# Patient Record
Sex: FEMALE | Race: WHITE | NOT HISPANIC OR LATINO | Employment: UNEMPLOYED | URBAN - METROPOLITAN AREA
[De-identification: names, ages, dates, MRNs, and addresses within clinical notes are randomized per-mention and may not be internally consistent; named-entity substitution may affect disease eponyms.]

---
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
## 2025-02-19 DIAGNOSIS — N94.6 DYSMENORRHEA: ICD-10-CM

## 2025-02-19 NOTE — TELEPHONE ENCOUNTER
Reason for call:   [x] Refill   [] Prior Auth  [] Other:     Office:   [] PCP/Provider -   [x] Specialty/Provider - Breanna Neumann,     Medication: norethindrone-ethinyl estradiol (JUNEL FE 1/20) 1-20 MG-MCG     Dose/Frequency: Take 1 tablet by mouth daily,     Quantity: 28    Pharmacy: [facility]    Does the patient have enough for 3 days?   [] Yes   [x] No - Send as HP to POD - pt is out of the medication

## 2025-02-24 NOTE — TELEPHONE ENCOUNTER
Pt requesting for courstey until follow up appt scheduled 3/19/25    Reason for call:    Refill        Office:    PCP/Provider -    Specialty/Provider - tor Oro      Medication: norethindrone-ethinyl estradiol (JUNEL FE 1/20) 1-20 MG-MCG      Dose/Frequency: Take 1 tablet by mouth daily,      Quantity: 28     Pharmacy: [facility]     Does the patient have enough for 3 days?    Yes    No - Send as HP to POD - pt is out of the medication

## 2025-02-25 RX ORDER — NORETHINDRONE ACETATE AND ETHINYL ESTRADIOL 1MG-20(21)
1 KIT ORAL DAILY
Qty: 90 TABLET | Refills: 0 | Status: SHIPPED | OUTPATIENT
Start: 2025-02-25

## 2025-03-19 ENCOUNTER — ANNUAL EXAM (OUTPATIENT)
Dept: OBGYN CLINIC | Facility: CLINIC | Age: 20
End: 2025-03-19
Payer: COMMERCIAL

## 2025-03-19 VITALS
BODY MASS INDEX: 22.5 KG/M2 | SYSTOLIC BLOOD PRESSURE: 112 MMHG | HEIGHT: 64 IN | WEIGHT: 131.8 LBS | DIASTOLIC BLOOD PRESSURE: 60 MMHG

## 2025-03-19 DIAGNOSIS — Z01.419 GYNECOLOGIC EXAM NORMAL: Primary | ICD-10-CM

## 2025-03-19 DIAGNOSIS — N94.6 DYSMENORRHEA: ICD-10-CM

## 2025-03-19 PROCEDURE — 99395 PREV VISIT EST AGE 18-39: CPT | Performed by: PHYSICIAN ASSISTANT

## 2025-03-19 RX ORDER — NORETHINDRONE ACETATE AND ETHINYL ESTRADIOL 1MG-20(21)
1 KIT ORAL DAILY
Qty: 84 TABLET | Refills: 3 | Status: SHIPPED | OUTPATIENT
Start: 2025-03-19

## 2025-03-19 NOTE — PROGRESS NOTES
[facility] - [place] [id number] [place] [address]    ASSESSMENT/PLAN: Kanika Bridges is a 19 y.o.  who presents for annual gynecologic exam.    Encounter for routine gynecologic examination  - Routine well woman exam completed today.  - HPV Vaccination status: Immunization series complete  - STI screening offered including HIV testing: offered, pt declined  - Contraceptive counseling discussed.  Current contraception: oral contraceptives (estrogen/progesterone), Junel Fe 1/20:       Additional problems addressed during this visit:  1. Gynecologic exam normal  Assessment & Plan:  Pap guidelines reviewed. Will plan to start pap smears at age 21.   Continue Junel Fe 1/20 script renewed to pharmacy. Breakthough pain likely secondary to being off OCP. If continues on OCP recommend considering pelvic ultrasound to further evaluate.   Return to office for annual or as needed.   2. Dysmenorrhea  -     norethindrone-ethinyl estradiol (JUNEL FE 1/20) 1-20 MG-MCG per tablet; Take 1 tablet by mouth daily      CC:  Annual Gynecologic Examination    HPI: Kanika Bridges is a 19 y.o.  who presents for annual gynecologic examination. Currently on Junel Fe 1/20, had to go off for 3-4 weeks because she didn't have refill, reports bleeding was heavy during that time. Has restarted.   Has been sexually active in the past. Has new boyfriend but has not been sexually active yet, declines STD testing.     ROS: Negative except as noted in HPI    Patient's last menstrual period was 2025 (exact date).       She  reports that she is not currently sexually active and has had partner(s) who are male. She reports using the following methods of birth control/protection: OCP and Pill.       The following portions of the patient's history were reviewed and updated as appropriate:   Past Medical History:   Diagnosis Date    Asthma     exercise induced    Curvature of spine     Vocal cord dysfunction      Past Surgical  "History:   Procedure Laterality Date    WRIST SURGERY Left     ulna and radius. 2019 and 2020     Family History   Problem Relation Age of Onset    Hashimoto's thyroiditis Mother     Coronary artery disease Maternal Grandmother     Thyroid disease Maternal Grandmother     Coronary artery disease Maternal Grandfather     Asthma Paternal Grandmother     Breast cancer Neg Hx     Uterine cancer Neg Hx     Ovarian cancer Neg Hx     Colon cancer Neg Hx      Social History     Socioeconomic History    Marital status: Single     Spouse name: None    Number of children: None    Years of education: None    Highest education level: None   Occupational History    None   Tobacco Use    Smoking status: Never     Passive exposure: Never    Smokeless tobacco: Never   Vaping Use    Vaping status: Never Used   Substance and Sexual Activity    Alcohol use: Never    Drug use: Never    Sexual activity: Not Currently     Partners: Male     Birth control/protection: OCP, Pill   Other Topics Concern    None   Social History Narrative    None     Social Drivers of Health     Financial Resource Strain: Not on file   Food Insecurity: Not on file   Transportation Needs: Not on file   Physical Activity: Not on file   Stress: Not on file   Social Connections: Not on file   Intimate Partner Violence: Not on file   Housing Stability: Not on file     Outpatient Medications Marked as Taking for the 3/19/25 encounter (Annual Exam) with Breanna Neumann PA-C   Medication    albuterol (PROVENTIL HFA,VENTOLIN HFA) 90 mcg/act inhaler    norethindrone-ethinyl estradiol (JUNEL FE 1/20) 1-20 MG-MCG per tablet    [DISCONTINUED] norethindrone-ethinyl estradiol (JUNEL FE 1/20) 1-20 MG-MCG per tablet     Allergies   Allergen Reactions    Iodinated Contrast Media Other (See Comments)     Red and yellow - she does not stay focus           Objective:  /60 (BP Location: Left arm, Patient Position: Sitting, Cuff Size: Standard)   Ht 5' 4\" (1.626 m)   Wt " 59.8 kg (131 lb 12.8 oz)   LMP 03/02/2025 (Exact Date)   Breastfeeding No   BMI 22.62 kg/m²        Chaperone present? Offered, declines.     Physical Exam  Constitutional:       Appearance: Normal appearance. She is well-developed.   Genitourinary:      Vulva and bladder normal.      No lesions in the vagina.      Right Labia: No rash, tenderness, lesions or skin changes.     Left Labia: No tenderness, lesions, skin changes or rash.     No labial fusion noted.      No inguinal adenopathy present in the right or left side.     No vaginal discharge, erythema, tenderness or bleeding.        Right Adnexa: not tender, not full and no mass present.     Left Adnexa: not tender, not full and no mass present.     No cervical motion tenderness, discharge or lesion.      Uterus is not enlarged, tender or irregular.      No uterine mass detected.     No urethral prolapse, tenderness or mass present.      Bladder is not tender.    Breasts:     Breasts are symmetrical.      Right: No swelling, bleeding, inverted nipple, mass, nipple discharge, skin change or tenderness.      Left: No swelling, bleeding, inverted nipple, mass, nipple discharge, skin change or tenderness.   HENT:      Head: Normocephalic and atraumatic.   Neck:      Thyroid: No thyromegaly.   Cardiovascular:      Rate and Rhythm: Normal rate and regular rhythm.      Heart sounds: Normal heart sounds. No murmur heard.     No friction rub. No gallop.   Pulmonary:      Effort: Pulmonary effort is normal. No respiratory distress.      Breath sounds: Normal breath sounds. No wheezing.   Abdominal:      General: There is no distension.      Palpations: Abdomen is soft. There is no mass.      Tenderness: There is no abdominal tenderness. There is no guarding or rebound.      Hernia: No hernia is present.   Lymphadenopathy:      Cervical: No cervical adenopathy.      Upper Body:      Right upper body: No supraclavicular, axillary or pectoral adenopathy.      Left upper  body: No supraclavicular, axillary or pectoral adenopathy.      Lower Body: No right inguinal adenopathy. No left inguinal adenopathy.   Neurological:      Mental Status: She is alert and oriented to person, place, and time.   Skin:     General: Skin is warm and dry.   Psychiatric:         Behavior: Behavior normal.             Breanna Neumann PA-C  3/19/2025 3:58 PM

## 2025-03-19 NOTE — ASSESSMENT & PLAN NOTE
Pap guidelines reviewed. Will plan to start pap smears at age 21.   Continue Junel Fe 1/20 script renewed to pharmacy. Breakthough pain likely secondary to being off OCP. If continues on OCP recommend considering pelvic ultrasound to further evaluate.   Return to office for annual or as needed.